# Patient Record
Sex: FEMALE | Race: WHITE
[De-identification: names, ages, dates, MRNs, and addresses within clinical notes are randomized per-mention and may not be internally consistent; named-entity substitution may affect disease eponyms.]

---

## 2022-09-07 ENCOUNTER — HOSPITAL ENCOUNTER (OUTPATIENT)
Dept: HOSPITAL 33 - SDC | Age: 77
Discharge: HOME | End: 2022-09-07
Attending: FAMILY MEDICINE
Payer: MEDICARE

## 2022-09-07 VITALS — SYSTOLIC BLOOD PRESSURE: 129 MMHG | DIASTOLIC BLOOD PRESSURE: 75 MMHG | HEART RATE: 71 BPM

## 2022-09-07 VITALS — OXYGEN SATURATION: 97 %

## 2022-09-07 DIAGNOSIS — R19.4: ICD-10-CM

## 2022-09-07 DIAGNOSIS — E11.9: ICD-10-CM

## 2022-09-07 DIAGNOSIS — R63.4: ICD-10-CM

## 2022-09-07 DIAGNOSIS — K57.30: Primary | ICD-10-CM

## 2022-09-07 PROCEDURE — 99100 ANES PT EXTEME AGE<1 YR&>70: CPT

## 2022-09-07 PROCEDURE — 82947 ASSAY GLUCOSE BLOOD QUANT: CPT

## 2022-09-07 NOTE — OP
SURGERY DATE/TIME:   09/07/2022   0700



PREOPERATIVE DIAGNOSES:   

1) Change in bowel habits. 

2) Unintentional weight loss. 



POSTOPERATIVE DIAGNOSES:    

1) Normal colon. 

2) Diverticulosis.



PROCEDURE:   Diagnostic colonoscopy. 



SURGEON: Mele Jones M.D.



ANESTHESIA:  MAC by Kelby Patel CRNA. 



ESTIMATED BLOOD LOSS:  None.



SPECIMENS:  None.



DESCRIPTION OF PROCEDURE:  After informed written consent was obtained, the patient was 
taken to the endoscopy suite.  She was placed in left lateral decubitus position. 
Anesthesia was titrated to desired level of consciousness. Digital rectal exam revealed 
external hemorrhoids but no internal lesions. The scope was inserted in the rectum and 
sequentially the entire colonic mucosa was traversed. The level of the cecum was reached 
and verified with direct visualization of the ileocecal valve. Upon withdrawal careful 
mucosal inspection revealed scattered diverticula throughout most of the length of the 
colon but no other mucosal abnormalities. Prior to withdrawal retroflexion was performed 
and showed no internal lesions. The scope was removed and the patient was transferred to 
the recovery room in good condition.